# Patient Record
Sex: FEMALE | Race: WHITE | Employment: FULL TIME | URBAN - METROPOLITAN AREA
[De-identification: names, ages, dates, MRNs, and addresses within clinical notes are randomized per-mention and may not be internally consistent; named-entity substitution may affect disease eponyms.]

---

## 2017-11-27 LAB
ABO/RH: NORMAL
ANTIBODY SCREEN: NORMAL
HEPATITIS C ANTIBODY INTERPRETATION: NORMAL

## 2017-11-28 LAB
BASOPHILS ABSOLUTE: 0 K/UL (ref 0–0.2)
BASOPHILS RELATIVE PERCENT: 0.3 %
EOSINOPHILS ABSOLUTE: 0 K/UL (ref 0–0.6)
EOSINOPHILS RELATIVE PERCENT: 0.4 %
HCT VFR BLD CALC: 34.7 % (ref 36–48)
HEMOGLOBIN: 11.9 G/DL (ref 12–16)
HEPATITIS B SURFACE ANTIGEN INTERPRETATION: ABNORMAL
HIV-1 AND HIV-2 ANTIBODIES: NORMAL
LYMPHOCYTES ABSOLUTE: 2 K/UL (ref 1–5.1)
LYMPHOCYTES RELATIVE PERCENT: 30.4 %
MCH RBC QN AUTO: 31.9 PG (ref 26–34)
MCHC RBC AUTO-ENTMCNC: 34.3 G/DL (ref 31–36)
MCV RBC AUTO: 92.8 FL (ref 80–100)
MONOCYTES ABSOLUTE: 0.4 K/UL (ref 0–1.3)
MONOCYTES RELATIVE PERCENT: 6.9 %
NEUTROPHILS ABSOLUTE: 4 K/UL (ref 1.7–7.7)
NEUTROPHILS RELATIVE PERCENT: 62 %
PDW BLD-RTO: 13 % (ref 12.4–15.4)
PLATELET # BLD: 255 K/UL (ref 135–450)
PMV BLD AUTO: 9.1 FL (ref 5–10.5)
RBC # BLD: 3.73 M/UL (ref 4–5.2)
RPR: ABNORMAL
RUBELLA ANTIBODY IGG: 182.7 IU/ML
WBC # BLD: 6.4 K/UL (ref 4–11)

## 2017-11-29 LAB — VZV IGG SER QL IA: 129 IV

## 2018-04-02 LAB
GLUCOSE CHALLENGE: 62 MG/DL
HCT VFR BLD CALC: 30.4 % (ref 36–48)
HEMOGLOBIN: 10.6 G/DL (ref 12–16)
MCH RBC QN AUTO: 32.3 PG (ref 26–34)
MCHC RBC AUTO-ENTMCNC: 34.8 G/DL (ref 31–36)
MCV RBC AUTO: 93 FL (ref 80–100)
PDW BLD-RTO: 13 % (ref 12.4–15.4)
PLATELET # BLD: 269 K/UL (ref 135–450)
PMV BLD AUTO: 8.4 FL (ref 5–10.5)
RBC # BLD: 3.27 M/UL (ref 4–5.2)
WBC # BLD: 10.8 K/UL (ref 4–11)

## 2018-07-12 PROBLEM — O42.90 PROM (PREMATURE RUPTURE OF MEMBRANES): Status: ACTIVE | Noted: 2018-07-12

## 2020-09-11 ENCOUNTER — HOSPITAL ENCOUNTER (EMERGENCY)
Age: 25
Discharge: HOME | End: 2020-09-11
Attending: EMERGENCY MEDICINE

## 2020-09-11 VITALS
OXYGEN SATURATION: 100 % | RESPIRATION RATE: 16 BRPM | HEART RATE: 72 BPM | HEIGHT: 67 IN | DIASTOLIC BLOOD PRESSURE: 72 MMHG | SYSTOLIC BLOOD PRESSURE: 106 MMHG | WEIGHT: 134.04 LBS | BODY MASS INDEX: 21.04 KG/M2 | TEMPERATURE: 98.9 F

## 2020-09-11 LAB — HCG(URINE) PREGNANCY TEST: NEGATIVE

## 2020-09-11 PROCEDURE — 84703 CHORIONIC GONADOTROPIN ASSAY: CPT

## 2020-09-11 PROCEDURE — 99283 EMERGENCY DEPT VISIT LOW MDM: CPT

## 2020-09-11 ASSESSMENT — PAIN DESCRIPTION - ORIENTATION
ORIENTATION: LOWER;MID
ORIENTATION: LOWER;MID

## 2020-09-11 ASSESSMENT — PAIN SCALES - GENERAL
PAINLEVEL_OUTOF10: 2
PAINLEVEL_OUTOF10: 2

## 2020-09-11 ASSESSMENT — PAIN DESCRIPTION - PAIN TYPE
TYPE: ACUTE PAIN
TYPE: ACUTE PAIN

## 2020-09-11 ASSESSMENT — PAIN DESCRIPTION - DESCRIPTORS
DESCRIPTORS: CRAMPING
DESCRIPTORS: CRAMPING

## 2020-09-11 ASSESSMENT — PAIN DESCRIPTION - LOCATION
LOCATION: ABDOMEN
LOCATION: ABDOMEN

## 2020-09-11 ASSESSMENT — PAIN DESCRIPTION - FREQUENCY
FREQUENCY: INTERMITTENT
FREQUENCY: INTERMITTENT

## 2020-09-11 NOTE — ED PROVIDER NOTES
CHIEF COMPLAINT  Chief Complaint   Patient presents with    Vaginal Bleeding     Onset yesterday, light pink, this morning bright red, slight cramping       HISTORY OF PRESENT ILLNESS  Abdullahi Pruett is a 23 y.o. female who presents to the ED complaining of a 1 day history of vaginal spotting and mild cramping with her last menstrual period being approximately 6 weeks ago. Patient reports that she had a faintly positive pregnancy test a month ago but had a blood and urine test recently performed by a clinic that were negative. Patient reports no significant abdominal or back pain. No vaginal discharge. No external genital lesions. She is a G1, P1 L1. No fevers or chills. No dysuria or hematuria. No other complaints, modifying factors or associated symptoms. Nursing notes reviewed.    Past Medical History:   Diagnosis Date    Anemia     taking iron this pregnancy     Past Surgical History:   Procedure Laterality Date    WISDOM TOOTH EXTRACTION       Family History   Problem Relation Age of Onset    Asthma Mother     Depression Mother     [de-identified] / [place] Mother     Cancer Maternal Grandmother     Arthritis Maternal Grandfather     High Blood Pressure Maternal Grandfather      Social History     Socioeconomic History    Marital status:      Spouse name: Not on file    Number of children: Not on file    Years of education: Not on file    Highest education level: Not on file   Occupational History    Not on file   Social Needs    Financial resource strain: Not on file    Food insecurity     Worry: Not on file     Inability: Not on file    Transportation needs     Medical: Not on file     Non-medical: Not on file   Tobacco Use    Smoking status: Current Every Day Smoker     Packs/day: 0.50     Types: Cigarettes     Last attempt to quit: 10/1/2017     Years since quittin.9    Smokeless tobacco: Never Used   Substance and Sexual Activity    Alcohol use: Not Currently  Drug use: No    Sexual activity: Yes     Partners: Male   Lifestyle    Physical activity     Days per week: Not on file     Minutes per session: Not on file    Stress: Not on file   Relationships    Social connections     Talks on phone: Not on file     Gets together: Not on file     Attends Restorationist service: Not on file     Active member of club or organization: Not on file     Attends meetings of clubs or organizations: Not on file     Relationship status: Not on file    Intimate partner violence     Fear of current or ex partner: Not on file     Emotionally abused: Not on file     Physically abused: Not on file     Forced sexual activity: Not on file   Other Topics Concern    Not on file   Social History Narrative    Not on file     No current facility-administered medications for this encounter. Current Outpatient Medications   Medication Sig Dispense Refill    ferrous sulfate 325 (65 Fe) MG tablet Take 325 mg by mouth daily      Prenatal Vit-Fe Fumarate-FA (PRENATAL VITAMIN PO) Take 1 tablet by mouth       No Known Allergies    REVIEW OF SYSTEMS  Positives and pertinent negatives as per HPI. Six other systems were reviewed and are negative. Nursing notes pertaining to ROS were reviewed. PHYSICAL EXAM   /72   Pulse 72   Temp 98.9 °F (37.2 °C) (Oral)   Resp 16   Ht 5' 7\" (1.702 m)   Wt 134 lb 0.6 oz (60.8 kg)   LMP 07/27/2020 (Exact Date)   SpO2 100%   BMI 20.99 kg/m²   GENERAL APPEARANCE: Awake and alert. Cooperative. No acute distress. HEAD: Normocephalic. Atraumatic. EYES: PERRL. EOM's grossly intact. No scleral icterus, injection or exudate  ENT: Mucous membranes are moist.  NECK: Supple. Normal ROM. CHEST:  Regular rate and rhythm, no murmurs, rubs or gallops  LUNGS: Breathing is unlabored. Speaking comfortably in full sentences. Clear through auscultation bilaterally  ABDOMEN: Nondistended, nontender  EXTREMITIES: MAEE. No acute deformities.    SKIN: Warm and dry.    NEUROLOGICAL: Alert and oriented. LABS  I have reviewed all labs for this visit. Results for orders placed or performed during the hospital encounter of 09/11/20   Pregnancy, Urine   Result Value Ref Range    HCG(Urine) Pregnancy Test Negative Detects HCG level >20 MIU/mL           ED COURSE/MDM  Vaginal bleeding, abnormal: Differential diagnosis includes anovulatory cycle versus completed miscarriage versus menstrual bleeding. Patient's abdominal exam is benign. Patient's pregnancy test is negative. Patient was advised to follow-up with her PCP if symptoms persist.    Patient was given scripts for the following medications. I counseled patient how to take these medications. New Prescriptions    No medications on file         CLINICAL IMPRESSION  1. Vaginal bleeding        Blood pressure 106/72, pulse 72, temperature 98.9 °F (37.2 °C), temperature source Oral, resp. rate 16, height 5' 7\" (1.702 m), weight 134 lb 0.6 oz (60.8 kg), last menstrual period 07/27/2020, SpO2 100 %, unknown if currently breastfeeding.       Follow-up with:  [facility]             Chapito Pham MD  09/11/20 [id number]

## 2020-09-11 NOTE — ED NOTES
Pt had a \"faint positive\" pregnancy test 4-5 weeks ago, then had a blood test approx a week ago that was negative. Pt then did another home pregnancy test this week that was negative but had an ultrasound and was told that it looked like there could have been something there? Pt states that she is having some slight cramping and she started bleeding yesterday. Was pink yesterday and bright red today. Pt also reports nausea, no vomiting.      [address], RN  09/11/20 [id number]